# Patient Record
Sex: FEMALE | Race: WHITE | NOT HISPANIC OR LATINO | Employment: FULL TIME | URBAN - METROPOLITAN AREA
[De-identification: names, ages, dates, MRNs, and addresses within clinical notes are randomized per-mention and may not be internally consistent; named-entity substitution may affect disease eponyms.]

---

## 2018-06-21 ENCOUNTER — OFFICE VISIT (OUTPATIENT)
Dept: OBGYN | Facility: CLINIC | Age: 23
End: 2018-06-21
Payer: COMMERCIAL

## 2018-06-21 VITALS
SYSTOLIC BLOOD PRESSURE: 110 MMHG | HEIGHT: 70 IN | BODY MASS INDEX: 22.05 KG/M2 | HEART RATE: 60 BPM | WEIGHT: 154 LBS | DIASTOLIC BLOOD PRESSURE: 70 MMHG

## 2018-06-21 DIAGNOSIS — N94.6 DYSMENORRHEA: ICD-10-CM

## 2018-06-21 DIAGNOSIS — Z01.419 ENCOUNTER FOR GYNECOLOGICAL EXAMINATION WITHOUT ABNORMAL FINDING: Primary | ICD-10-CM

## 2018-06-21 PROCEDURE — G0145 SCR C/V CYTO,THINLAYER,RESCR: HCPCS | Performed by: NURSE PRACTITIONER

## 2018-06-21 PROCEDURE — 99385 PREV VISIT NEW AGE 18-39: CPT | Performed by: NURSE PRACTITIONER

## 2018-06-21 RX ORDER — DROSPIRENONE AND ETHINYL ESTRADIOL 0.03MG-3MG
KIT ORAL
Qty: 112 TABLET | Refills: 4 | Status: SHIPPED | OUTPATIENT
Start: 2018-06-21 | End: 2019-01-18

## 2018-06-21 RX ORDER — DROSPIRENONE AND ETHINYL ESTRADIOL 0.03MG-3MG
KIT ORAL
Refills: 2 | COMMUNITY
Start: 2018-05-26 | End: 2018-06-21

## 2018-06-21 ASSESSMENT — PATIENT HEALTH QUESTIONNAIRE - PHQ9: 5. POOR APPETITE OR OVEREATING: SEVERAL DAYS

## 2018-06-21 ASSESSMENT — ANXIETY QUESTIONNAIRES
7. FEELING AFRAID AS IF SOMETHING AWFUL MIGHT HAPPEN: NOT AT ALL
5. BEING SO RESTLESS THAT IT IS HARD TO SIT STILL: NOT AT ALL
6. BECOMING EASILY ANNOYED OR IRRITABLE: SEVERAL DAYS
GAD7 TOTAL SCORE: 4
2. NOT BEING ABLE TO STOP OR CONTROL WORRYING: NOT AT ALL
1. FEELING NERVOUS, ANXIOUS, OR ON EDGE: SEVERAL DAYS
IF YOU CHECKED OFF ANY PROBLEMS ON THIS QUESTIONNAIRE, HOW DIFFICULT HAVE THESE PROBLEMS MADE IT FOR YOU TO DO YOUR WORK, TAKE CARE OF THINGS AT HOME, OR GET ALONG WITH OTHER PEOPLE: SOMEWHAT DIFFICULT
3. WORRYING TOO MUCH ABOUT DIFFERENT THINGS: SEVERAL DAYS

## 2018-06-21 NOTE — LETTER
June 29, 2018      Eva Veto  [address] [facility]   [place] [address]    Dear ,      I am happy to inform you that your recent cervical cancer screening test (PAP smear) was normal.      Preventative screenings such as this help to ensure your health for years to come. You should repeat a pap smear in 1 year, unless otherwise directed.      You will still need to return to the clinic every year for your annual exam and other preventive tests.     Please contact the clinic at [phone] if you have further questions.       Sincerely,      Nydia Linares, APRN CNP/rlm

## 2018-06-21 NOTE — PROGRESS NOTES
Eva is a 22 year old female who presents for annual exam.     Besides routine health maintenance, she has no other health concerns today .    HPI:    New patient here today for her 1st gyn exam. She is feeling well. No complaints. She is taking Chris (Brand). Menses are normal flow, heaviest on day 2. Minimal cramping. Lasting 4 days total.     She is going to the [employer] [place] in July and is wondering if she can skip her menses. Continuous use of OCPs discussed today.    STD testing was last done at [facility].     The patient does not have a primary care provider.      GYNECOLOGIC HISTORY:    Patient's last menstrual period was 05/30/2018.  Her current contraception method is: oral contraceptives.  She  reports that she has never smoked. She has never used smokeless tobacco.  Patient is sexually active.  STD testing offered?  Declined     Last PHQ-9 score on record =   PHQ-9 SCORE 6/21/2018   Total Score 2     Last GAD7 score on record =   OVIDIO-7 SCORE 6/21/2018   Total Score 4     Alcohol Score = 3    HEALTH MAINTENANCE:  Cholesterol: never screened  Last Mammo: Never, Result: not applicable, Next Mammo: screen age 40  Pap: due today  Colonoscopy:  never, Result: not applicable, Next Colonoscopy: screen age 50  Dexa:  never  Health maintenance updated:  yes    HISTORY:  Obstetric History     No data available          There is no problem list on file for this patient.    History reviewed. No pertinent surgical history.   Social History   Substance Use Topics     Smoking status: Never Smoker     Smokeless tobacco: Never Used     Alcohol use Not on file           Current Outpatient Prescriptions   Medication Sig     CHRIS 28 3-0.03 MG per tablet 1 tablet daily by mouth. Active tabs only, skip placebo pills. Take continuously     [DISCONTINUED] CHRIS 28 3-0.03 MG per tablet TK 1 T PO QD     No current facility-administered medications for this visit.      No Known Allergies    Past medical,  "surgical, social and family histories were reviewed and updated in EPIC.    ROS:   12 point review of systems negative other than symptoms noted below.  Breast: Tenderness    EXAM:  /70  Pulse 60  Ht 5' 10\" (1.778 m)  Wt 154 lb (69.9 kg)  LMP 05/30/2018  BMI 22.1 kg/m2   BMI: Body mass index is 22.1 kg/(m^2).    PHYSICAL EXAM:  Constitutional:  Appearance: Well nourished, well developed, alert, in no acute distress  Neck:  Lymph Nodes:  No lymphadenopathy present    Thyroid:  Gland size normal, nontender, no nodules or masses present  on palpation  Chest:  Respiratory Effort:  Breathing unlabored  Cardiovascular:    Heart: Auscultation:  Regular rate, normal rhythm, no murmurs present  Breasts: Inspection of Breasts:  No lymphadenopathy present., Palpation of Breasts and Axillae:  No masses present on palpation, no breast tenderness., Axillary Lymph Nodes:  No lymphadenopathy present. and No nodularity, asymmetry or nipple discharge bilaterally.  Gastrointestinal:   Abdominal Examination:  Abdomen nontender to palpation, tone normal without rigidity or guarding, no masses present, umbilicus without lesions   Liver and Spleen:  No hepatomegaly present, liver nontender to palpation    Hernias:  No hernias present  Lymphatic: Lymph Nodes:  No other lymphadenopathy present  Skin:  General Inspection:  No rashes present, no lesions present, no areas of  discoloration    Genitalia and Groin:  No rashes present, no lesions present, no areas of  discoloration, no masses present  Neurologic/Psychiatric:    Mental Status:  Oriented X3     Pelvic Exam:  External Genitalia:     Normal appearance for age, no discharge present, no tenderness present, no inflammatory lesions present, color normal  Vagina:     Normal vaginal vault without central or paravaginal defects, no discharge present, no inflammatory lesions present, no masses present  Bladder:     Nontender to palpation  Urethra:   Urethral Body:  Urethra " palpation normal, urethra structural support normal   Urethral Meatus:  No erythema or lesions present  Cervix:     Appearance healthy, no lesions present, nontender to palpation, no bleeding present  Uterus:     Uterus: firm, normal sized and nontender, anteverted in position.   Adnexa:     No adnexal tenderness present, no adnexal masses present  Perineum:     Perineum within normal limits, no evidence of trauma, no rashes or skin lesions present  Anus:     Anus within normal limits, no hemorrhoids present  Inguinal Lymph Nodes:     No lymphadenopathy present  Pubic Hair:     Normal pubic hair distribution for age  Genitalia and Groin:     No rashes present, no lesions present, no areas of discoloration, no masses present      COUNSELING:   Special attention given to:        Regular exercise       Healthy diet/nutrition       Contraception       Safe sex practices/STD prevention    BMI: Body mass index is 22.1 kg/(m^2).      ASSESSMENT:  22 year old female with satisfactory annual exam.    ICD-10-CM    1. Encounter for gynecological examination without abnormal finding Z01.419 Pap imaged thin layer screen only - recommended age 21 - 24 years   2. Dysmenorrhea N94.6 CHRIS 28 3-0.03 MG per tablet       PLAN:  Healthy, normal gyn exam. 1st pap today. S/p gardasil series. Ok to continue OCP's x1 year. Will use continuously. Annual pap screening recommended    MATT Jonas CNP

## 2018-06-21 NOTE — MR AVS SNAPSHOT
"              After Visit Summary   2018    Eva Laws    MRN: [id number]           Patient Information     Date Of Birth          1995        Visit Information        Provider Department      2018 7:30 AM Nydia Linares APRN CNP [facility]        Today's Diagnoses     Encounter for gynecological examination without abnormal finding    -  1       Follow-ups after your visit        Who to contact     If you have questions or need follow up information about today's clinic visit or your schedule please contact [facility] directly at [phone].  Normal or non-critical lab and imaging results will be communicated to you by Radialogicahart, letter or phone within 4 business days after the clinic has received the results. If you do not hear from us within 7 days, please contact the clinic through Radialogicahart or phone. If you have a critical or abnormal lab result, we will notify you by phone as soon as possible.  Submit refill requests through [employer] or call your pharmacy and they will forward the refill request to us. Please allow 3 business days for your refill to be completed.          Additional Information About Your Visit        MyChart Information     [employer] lets you send messages to your doctor, view your test results, renew your prescriptions, schedule appointments and more. To sign up, go to [email or web address] . Click on \"Log in\" on the left side of the screen, which will take you to the Welcome page. Then click on \"Sign up Now\" on the right side of the page.     You will be asked to enter the access code listed below, as well as some personal information. Please follow the directions to create your username and password.     Your access code is: [email or web address]  Expires: 2018  4:05 PM     Your access code will  in 90 days. If you need help or a new code, please call your [facility] or [phone].        Care EveryWhere ID     This is " "your Care EveryWhere ID. This could be used by other organizations to access your [facility] medical records  [id number]        Your Vitals Were     Pulse Height Last Period BMI (Body Mass Index)          60 5' 10\" (1.778 m) 05/30/2018 22.1 kg/m2         Blood Pressure from Last 3 Encounters:   06/21/18 110/70    Weight from Last 3 Encounters:   06/21/18 154 lb (69.9 kg)              Today, you had the following     No orders found for display       Primary Care Provider    None Specified       No primary provider on file.        Equal Access to Services     [facility]: Hadii aad ku hadasho Soomaali, waaxda luqadaha, qaybta kaalmada adeegyachace, vi henry . So [facility] [phone].    ATENCIÓN: Si habla español, tiene a santos disposición servicios gratuitos de asistencia lingüística. Llame al [phone].    We comply with applicable federal civil rights laws and [place] laws. We do not discriminate on the basis of race, color, national origin, age, disability, sex, sexual orientation, or gender identity.            Thank you!     Thank you for choosing [facility]  for your care. Our goal is always to provide you with excellent care. Hearing back from our patients is one way we can continue to improve our services. Please take a few minutes to complete the written survey that you may receive in the mail after your visit with us. Thank you!             Your Updated Medication List - Protect others around you: Learn how to safely use, store and throw away your medicines at www.disposemymeds.org.          This list is accurate as of 6/21/18  8:03 AM.  Always use your most recent med list.                   Brand Name Dispense Instructions for use Diagnosis    CHRIS 28 3-0.03 MG per tablet   Generic drug:  drospirenone-ethinyl estradiol      TK 1 T PO QD          "

## 2018-06-22 ASSESSMENT — PATIENT HEALTH QUESTIONNAIRE - PHQ9: SUM OF ALL RESPONSES TO PHQ QUESTIONS 1-9: 2

## 2018-06-22 ASSESSMENT — ANXIETY QUESTIONNAIRES: GAD7 TOTAL SCORE: 4

## 2018-06-25 LAB
COPATH REPORT: NORMAL
PAP: NORMAL

## 2018-12-27 ENCOUNTER — TELEPHONE (OUTPATIENT)
Dept: OBGYN | Facility: CLINIC | Age: 23
End: 2018-12-27

## 2018-12-27 NOTE — TELEPHONE ENCOUNTER
Received fax from pharmacy that plan does not cover Yi.    Upon review of chart patient was last seen 6/21/18 for annual exam with Nydia Linares and the note specifies that the patient is taking Yi brand name. RX is written as JUVENTINO.     Left message for patient to call back.     Need to ask patient if she's been having any trouble picking up the brand RX, or if her insurance changed since it is now generating the need for a PA with her recent refill.    Also, before starting the PA, will need reason why patient needs to take brand to complete that piece of information.

## 2018-12-31 NOTE — TELEPHONE ENCOUNTER
CENTRAL PRIOR AUTHORIZATION  [phone]    PA Initiation    Medication: CHRIS 28 3-0.03 MG per tablet - INITAITED 12/31/2018  Insurance Company: [facility] [employer] - Phone [phone] Fax [phone]  Pharmacy Filling the Rx: [facility] [address] [place] [address] AT [address]  Filling Pharmacy Phone: [phone]  Filling Pharmacy Fax:    Start Date: 12/31/2018    Submitted and am waiting on the question set to load from the insurance company.

## 2018-12-31 NOTE — TELEPHONE ENCOUNTER
Prior Authorization Retail Medication Request    Medication/Dose: YI 28 3-0.03 MG per tablet  ICD code (if different than what is on RX):    Previously Tried and Failed:    Rationale:  as stated below    Insurance Name:   [employer]   Insurance ID:  [id number]       Pharmacy Information (if different than what is on RX)  Name: Phone:    -------------------------------------------------------------------------------------------------------------------------  Spoke to patient regarding the need for a PA and further clarification.    Patient states she has not had an insurance change and has been picking up her RX without an issue.  Patient needs this RX JUVENTINO due to 1) irregular bleeding on other brand/generic,  2) hormone levels affecting acne.    She has been successful taking Yi (BRAND) continuously for these reasons.    Patient is aware we will start the PA process and update her.

## 2019-01-03 ENCOUNTER — TELEPHONE (OUTPATIENT)
Dept: OBGYN | Facility: CLINIC | Age: 24
End: 2019-01-03

## 2019-01-03 DIAGNOSIS — N94.6 DYSMENORRHEA: ICD-10-CM

## 2019-01-03 NOTE — TELEPHONE ENCOUNTER
Upon calling the patient's insruance and pharmacy.  The pharmacy is billing the medication to a [employer] plan and now a [employer].  The [employer] plan does cover the brand without a PA.  The [employer] insurance does not, plan exclusion.  I did call [employer] and we can do a exception to coverage determination.  I am waiting on the form that should be faxed from them to the PA department at [phone].      I will finish this documentation for this insurance and start a new one for the different insurance.

## 2019-01-03 NOTE — TELEPHONE ENCOUNTER
I have called both the insurance company and [facility].  Yesterday, the claim was going through on the insurance side end of getting a paid claim stating that it should be covered.  [facility] was submitting the claim, but not getting a claim number and showing that the cost should be $10.00.  I called to double check with [facility] if the claim did go through, but it is now showing a plan exclusion.  They will fax the PA department at [phone] and I will look into what is going on further.

## 2019-01-03 NOTE — TELEPHONE ENCOUNTER
CENTRAL PRIOR AUTHORIZATION  [phone]    PA Initiation    Medication: CHRIS 28 3-0.03 MG per tablet - INITAITED THROUGH [employer] 01/03/2019 WAITING ON THE FORM  Insurance Company: [employer] - Phone [phone] Fax [phone]  [facility] [address] [place] [address] AT [facility] [address]  Filling Pharmacy Phone: [phone]  Filling Pharmacy Fax:    Start Date: 1/3/2019    I had to call [employer] to have them send me the form by fax.  When I tried to initiate the request, it said that the patient did not have active coverage.  When I called [employer] and verified the patient information, I was informed that her ID with them is [id number].

## 2019-01-03 NOTE — TELEPHONE ENCOUNTER
Prior Authorization Not Needed per Insurance    Medication: YI 28 3-0.03 MG per tablet - PA NOT NEEDED UNDER [employer]-BUT PHARMACY BILLING A [employer] PLAN 01/03/2019  Insurance Company: [facility] [employer] - Phone [phone] Fax [phone]  Expected [employer]:      Pharmacy Filling the Rx: [facility] [address] [place] [address] AT [facility] [address]  Pharmacy Notified: Yes  Patient Notified: No    Under the plan with [employer], the brand Yi is covered without a PA.  The pharmacy on the other hand is billing to a [employer] plan.  After many calls, I did find out that they were billing a different plan. From the many calls to insurance, it sounds like she is still active with [employer] and she is active with [employer] since 2016.  They are going to fax a 'exception to coverage form as the medication with them is a plan exclusion and that is what they other to try and see if we can get it covered that way.  I am currently just waiting on the form from them.

## 2019-01-04 NOTE — TELEPHONE ENCOUNTER
PRIOR AUTHORIZATION DENIED    Medication: CHRIS 28 3-0.03 MG per tablet - DENIED THROUGH [employer] 01/04/2019    Denial Date: 1/4/2019    Denial Rational: IT HAS BEEN DETERMINED THAT THE SUBMITTED REQUEST DOES NOT MEET THE CRITERIA ESTABLISHED FOR COVERAGE OF THIS MEDIATION, DETERMINED THAT HAS BEEN DETERMINED BY THE [facility] PHARMACY AND THERAPEUTICS COMMITTEE FOR THE FOLLOWING REASON(S): (1) THE GENERIC VERSION OF THIS DRUG, CALLED OCELLA MUST BE TRIED/FAILED. (2) ALL OTHER FORMULARY DRUGS USED FOR YOUR CONDITION MUST BE TRIED AND FAILED. OTHER DRUGS THAT CAN BE USED ARE GIANVI. (3) A COMPLETED FDA MEDWATCH FORM WAS NOT IS USED TO ALERT THE FDA OR EFFICACY AND SAFETY PROBLEMS WITH THE GENERIC DRUG. PLEASE LOOK AT THE FORMULARY FOR A LIST OF COVERED DRUGS.        Appeal Information: IF THE PROVIDER WOULD LIKE APPEAL THIS DENIAL, PLEASE HAVE THEM PROVIDE A LETTER OF MEDICAL NECESSITY ALONG WITH ANY DOCUMENTATION THAT STATES THERAPIES TRIED/OUTCOMES. ONCE IT HAS BEEN PLACED IN THE PATIENT'S CHART, PLEASE NOTIFY THE PA TEAM.

## 2019-01-17 NOTE — TELEPHONE ENCOUNTER
Patient was notified of denial-she doesn't have a strong preference and would like Nydia to pick btwn the 2 options listed. ocella or gianvi

## 2019-01-18 RX ORDER — DROSPIRENONE AND ETHINYL ESTRADIOL 0.03MG-3MG
KIT ORAL
Qty: 112 TABLET | Refills: 4 | Status: SHIPPED | OUTPATIENT
Start: 2019-01-18 | End: 2019-07-02

## 2019-01-18 NOTE — TELEPHONE ENCOUNTER
Ocella is the generic equivalent to Yi. JUVENTINO removed from RX and resent to pharmacy.    Gianvi is a slightly lower estrogen dose and the equivalent to Kathleen.

## 2019-06-27 NOTE — PROGRESS NOTES
Eva is a 23 year old No obstetric history on file. female who presents for annual exam.     Besides routine health maintenance, she has no other health concerns today .    HPI:  The patient's PCP is  No primary care provider on file. Pt here today for her annual exam. She is feeling well. Starting grad school for social work.    ocps are working well. No issues.     Pap up to date. NIL in 2018. Will pap in 2021. Will update CT GC today.         GYNECOLOGIC HISTORY:    Patient's last menstrual period was 06/19/2019.  Her current contraception method is: oral contraceptives.  She  reports that she has never smoked. She has never used smokeless tobacco.    Patient is sexually active.  STD testing offered?  Accepted  Last PHQ-9 score on record =   PHQ-9 SCORE 7/2/2019   PHQ-9 Total Score 6     Last GAD7 score on record =   OVIDIO-7 SCORE 7/2/2019   Total Score 5     Alcohol Score = 2    HEALTH MAINTENANCE:  Cholesterol: (No results found for: CHOL   Last Mammo: never, Result: not applicable, Next Mammo: due age 41.  Pap: (  Lab Results   Component Value Date    PAP NIL 06/21/2018    )   Colonoscopy:  never, Result: not applicable, Next Colonoscopy: due age 51 years.  Dexa:  NA    Health maintenance updated:  yes    HISTORY:  OB History   No data available       There is no problem list on file for this patient.    No past surgical history on file.   Social History     Tobacco Use     Smoking status: Never Smoker     Smokeless tobacco: Never Used   Substance Use Topics     Alcohol use: Not on file           Current Outpatient Medications   Medication Sig     drospirenone-ethinyl estradiol (CHRIS 28) 3-0.03 MG tablet 1 tablet daily by mouth. Active tabs only, skip placebo pills. Take continuously     No current facility-administered medications for this visit.      No Known Allergies    Past medical, surgical, social and family histories were reviewed and updated in EPIC.    ROS:   12 point review of systems negative  "other than symptoms noted below.  Skin: Acne    EXAM:  /62   Pulse 58   Ht 1.778 m (5' 10\")   Wt 71.2 kg (157 lb)   LMP 06/19/2019   BMI 22.53 kg/m     BMI: Body mass index is 22.53 kg/m .    PHYSICAL EXAM:  Constitutional:  Appearance: Well nourished, well developed, alert, in no acute distress  Neck:  Lymph Nodes:  No lymphadenopathy present    Thyroid:  Gland size normal, nontender, no nodules or masses present  on palpation  Chest:  Respiratory Effort:  Breathing unlabored  Cardiovascular:    Heart: Auscultation:  Regular rate, normal rhythm, no murmurs present  Breasts: Inspection of Breasts:  No lymphadenopathy present., Palpation of Breasts and Axillae:  No masses present on palpation, no breast tenderness., Axillary Lymph Nodes:  No lymphadenopathy present. and No nodularity, asymmetry or nipple discharge bilaterally.  Gastrointestinal:   Abdominal Examination:  Abdomen nontender to palpation, tone normal without rigidity or guarding, no masses present, umbilicus without lesions   Liver and Spleen:  No hepatomegaly present, liver nontender to palpation    Hernias:  No hernias present  Lymphatic: Lymph Nodes:  No other lymphadenopathy present  Skin:  General Inspection:  No rashes present, no lesions present, no areas of  discoloration    Genitalia and Groin:  No rashes present, no lesions present, no areas of  discoloration, no masses present  Neurologic/Psychiatric:    Mental Status:  Oriented X3     Pelvic Exam:  External Genitalia:     Normal appearance for age, no discharge present, no tenderness present, no inflammatory lesions present, color normal  Vagina:     Normal vaginal vault without central or paravaginal defects, no discharge present, no inflammatory lesions present, no masses present  Bladder:     Nontender to palpation  Urethra:   Urethral Body:  Urethra palpation normal, urethra structural support normal   Urethral Meatus:  No erythema or lesions present  Cervix:     Appearance " healthy, no lesions present, nontender to palpation, no bleeding present  Uterus:     Uterus: firm, normal sized and nontender, midplane in position.   Adnexa:     No adnexal tenderness present, no adnexal masses present  Perineum:     Perineum within normal limits, no evidence of trauma, no rashes or skin lesions present  Anus:     Anus within normal limits, no hemorrhoids present  Inguinal Lymph Nodes:     No lymphadenopathy present  Pubic Hair:     Normal pubic hair distribution for age  Genitalia and Groin:     No rashes present, no lesions present, no areas of discoloration, no masses present      COUNSELING:   Special attention given to:        Regular exercise       Healthy diet/nutrition       Contraception       Safe sex practices/STD prevention    BMI: Body mass index is 22.53 kg/m .      ASSESSMENT:  23 year old female with satisfactory annual exam.    ICD-10-CM    1. Encounter for gynecological examination without abnormal finding Z01.419    2. Dysmenorrhea N94.6 drospirenone-ethinyl estradiol (CHRIS 28) 3-0.03 MG tablet   3. Screen for STD (sexually transmitted disease) Z11.3 NEISSERIA GONORRHOEA PCR   4. Screening for chlamydial disease Z11.8 CHLAMYDIA TRACHOMATIS PCR       PLAN:  Normal gyn exam. Will update on STD testing. Ok to continue OCP's x1 year.     MATT Jonas CNP

## 2019-07-02 ENCOUNTER — OFFICE VISIT (OUTPATIENT)
Dept: OBGYN | Facility: CLINIC | Age: 24
End: 2019-07-02
Payer: COMMERCIAL

## 2019-07-02 VITALS
DIASTOLIC BLOOD PRESSURE: 62 MMHG | HEART RATE: 58 BPM | WEIGHT: 157 LBS | BODY MASS INDEX: 22.48 KG/M2 | SYSTOLIC BLOOD PRESSURE: 102 MMHG | HEIGHT: 70 IN

## 2019-07-02 DIAGNOSIS — Z11.8 SCREENING FOR CHLAMYDIAL DISEASE: ICD-10-CM

## 2019-07-02 DIAGNOSIS — Z01.419 ENCOUNTER FOR GYNECOLOGICAL EXAMINATION WITHOUT ABNORMAL FINDING: Primary | ICD-10-CM

## 2019-07-02 DIAGNOSIS — N94.6 DYSMENORRHEA: ICD-10-CM

## 2019-07-02 DIAGNOSIS — Z11.3 SCREEN FOR STD (SEXUALLY TRANSMITTED DISEASE): ICD-10-CM

## 2019-07-02 PROCEDURE — 87591 N.GONORRHOEAE DNA AMP PROB: CPT | Performed by: NURSE PRACTITIONER

## 2019-07-02 PROCEDURE — 99395 PREV VISIT EST AGE 18-39: CPT | Performed by: NURSE PRACTITIONER

## 2019-07-02 PROCEDURE — 87491 CHLMYD TRACH DNA AMP PROBE: CPT | Performed by: NURSE PRACTITIONER

## 2019-07-02 RX ORDER — DROSPIRENONE AND ETHINYL ESTRADIOL 0.03MG-3MG
KIT ORAL
Qty: 112 TABLET | Refills: 4 | Status: SHIPPED | OUTPATIENT
Start: 2019-07-02 | End: 2020-09-15

## 2019-07-02 ASSESSMENT — MIFFLIN-ST. JEOR: SCORE: 1552.4

## 2019-07-02 ASSESSMENT — ANXIETY QUESTIONNAIRES
1. FEELING NERVOUS, ANXIOUS, OR ON EDGE: MORE THAN HALF THE DAYS
5. BEING SO RESTLESS THAT IT IS HARD TO SIT STILL: NOT AT ALL
IF YOU CHECKED OFF ANY PROBLEMS ON THIS QUESTIONNAIRE, HOW DIFFICULT HAVE THESE PROBLEMS MADE IT FOR YOU TO DO YOUR WORK, TAKE CARE OF THINGS AT HOME, OR GET ALONG WITH OTHER PEOPLE: SOMEWHAT DIFFICULT
6. BECOMING EASILY ANNOYED OR IRRITABLE: SEVERAL DAYS
3. WORRYING TOO MUCH ABOUT DIFFERENT THINGS: SEVERAL DAYS
2. NOT BEING ABLE TO STOP OR CONTROL WORRYING: SEVERAL DAYS
GAD7 TOTAL SCORE: 5
7. FEELING AFRAID AS IF SOMETHING AWFUL MIGHT HAPPEN: NOT AT ALL

## 2019-07-02 ASSESSMENT — PATIENT HEALTH QUESTIONNAIRE - PHQ9
SUM OF ALL RESPONSES TO PHQ QUESTIONS 1-9: 6
5. POOR APPETITE OR OVEREATING: NOT AT ALL

## 2019-07-03 LAB
C TRACH DNA SPEC QL NAA+PROBE: NEGATIVE
N GONORRHOEA DNA SPEC QL NAA+PROBE: NEGATIVE
SPECIMEN SOURCE: NORMAL
SPECIMEN SOURCE: NORMAL

## 2019-07-03 ASSESSMENT — ANXIETY QUESTIONNAIRES: GAD7 TOTAL SCORE: 5

## 2020-03-11 ENCOUNTER — HEALTH MAINTENANCE LETTER (OUTPATIENT)
Age: 25
End: 2020-03-11

## 2020-09-15 DIAGNOSIS — N94.6 DYSMENORRHEA: ICD-10-CM

## 2020-09-15 RX ORDER — DROSPIRENONE AND ETHINYL ESTRADIOL 0.03MG-3MG
KIT ORAL
Qty: 28 TABLET | Refills: 1 | Status: SHIPPED | OUTPATIENT
Start: 2020-09-15 | End: 2024-05-29 | Stop reason: ALTCHOICE

## 2020-09-15 NOTE — TELEPHONE ENCOUNTER
"Requested Prescriptions   Pending Prescriptions Disp Refills     drospirenone-ethinyl estradiol (CHRIS 28) 3-0.03 MG tablet 112 tablet 4     Si tablet daily by mouth. Active tabs only, skip placebo pills. Take continuously       Contraceptives Protocol Failed - 9/15/2020  7:32 AM        Failed - Recent (12 mo) or future (30 days) visit within the authorizing provider's specialty     Patient has had an office visit with the authorizing provider or a provider within the authorizing providers department within the previous 12 mos or has a future within next 30 days. See \"Patient Info\" tab in inbasket, or \"Choose Columns\" in Meds & Orders section of the refill encounter.              Passed - Patient is not a current smoker if age is 35 or older        Passed - Medication is active on med list        Passed - No active pregnancy on record        Passed - No positive pregnancy test in past 12 months           Last Written Prescription Date:  19  Last Fill Quantity: 112,  # refills: 4   Last office visit: 2019 with prescribing provider:  Nydia Linares   Future Office Visit:  none    Medication is being filled for 1 time refill only due to:  Patient needs to be seen because it has been more than one year since last visit.   Mirta Acosta RN on 9/15/2020 at 7:54 AM        "

## 2021-01-03 ENCOUNTER — HEALTH MAINTENANCE LETTER (OUTPATIENT)
Age: 26
End: 2021-01-03

## 2021-04-25 ENCOUNTER — HEALTH MAINTENANCE LETTER (OUTPATIENT)
Age: 26
End: 2021-04-25

## 2021-10-10 ENCOUNTER — HEALTH MAINTENANCE LETTER (OUTPATIENT)
Age: 26
End: 2021-10-10

## 2022-01-29 ENCOUNTER — HEALTH MAINTENANCE LETTER (OUTPATIENT)
Age: 27
End: 2022-01-29

## 2022-07-19 ENCOUNTER — TRANSFERRED RECORDS (OUTPATIENT)
Dept: HEALTH INFORMATION MANAGEMENT | Facility: CLINIC | Age: 27
End: 2022-07-19

## 2022-09-18 ENCOUNTER — HEALTH MAINTENANCE LETTER (OUTPATIENT)
Age: 27
End: 2022-09-18

## 2023-05-06 ENCOUNTER — HEALTH MAINTENANCE LETTER (OUTPATIENT)
Age: 28
End: 2023-05-06

## 2024-05-28 NOTE — PROGRESS NOTES
"Eva is a 28 year old  female who presents for annual exam.     Besides routine health maintenance, would like discuss right breast pain and a change her last cycle.    HPI:    Eva is here today for annual physical exam and refill on OCPs. She is doing well on OCPs, has been seeing [facility] recently due to insurance reasons. She does report getting her cycle twice in the last month but her and her boyfriend were trialing the Keto diet at the time. She is sexually active, but declines STD testing today.     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       GYNECOLOGIC HISTORY:    Patient's last menstrual period was 2024.  Regular menses? Had one irregular recently  Menses every 28 days.  Length of menses: 4 days  Her current contraception method is: oral contraceptives.  She  reports that she has never smoked. She has never used smokeless tobacco.  Patient is sexually active.  STD testing offered?  Declined    Last PHQ-9 score on record =       2024     8:47 AM   PHQ-9 SCORE   PHQ-9 Total Score 1     Last GAD7 score on record =       2024     8:47 AM   OVIDIO-7 SCORE   Total Score 3     Alcohol Score = 4    HEALTH MAINTENANCE:  Cholesterol: (No results found for: \"CHOL\"   Last Mammo: N/A, Result: not applicable, Next Mammo: screen age 40  Pap:   Lab Results   Component Value Date    PAP NIL 2018   Colonoscopy:  N/A, Result: not applicable, Next Colonoscopy: screen age 45  Dexa:  N/A  Health maintenance reviewed    HISTORY:  OB History    Para Term  AB Living   0 0 0 0 0 0   SAB IAB Ectopic Multiple Live Births   0 0 0 0 0       There is no problem list on file for this patient.    Past Surgical History:   Procedure Laterality Date    NO HISTORY OF SURGERY        Social History     Tobacco Use    Smoking status: Never    Smokeless tobacco: Never   Substance Use Topics    Alcohol use: Not on file " "     Problem (# of Occurrences) Relation (Name,Age of Onset)    Heart Disease (1) Father    Breast Cancer (1) Maternal Grandmother    No Known Problems (1) Mother              Current Outpatient Medications   Medication Sig Dispense Refill    VESTURA 3-0.02 MG tablet Take 1 tablet by mouth daily 84 tablet 3     No current facility-administered medications for this visit.     Allergies   Allergen Reactions    Sulfa Antibiotics Hives       Past medical, surgical, social and family histories were reviewed and updated in [facility].    ROS:     No urinary frequency or dysuria, bladder or kidney problems, reports getting two menstrual cycles in the last month, otherwise has been regular    EXAM:  /70   Ht 1.778 m (5' 10\")   Wt 75.8 kg (167 lb)   LMP 05/23/2024   BMI 23.96 kg/m     BMI: Body mass index is 23.96 kg/m .    PHYSICAL EXAM:  Constitutional:   Appearance: Well nourished, well developed, alert, in no acute distress  Breasts: Inspection of Breasts:  No lymphadenopathy present., Palpation of Breasts and Axillae:  No masses present on palpation, no breast tenderness., Axillary Lymph Nodes:  No lymphadenopathy present., and No nodularity, asymmetry or nipple discharge bilaterally.  Skin:  General Inspection:  No rashes present, no lesions present, no areas of  discoloration  Neurologic:    Mental Status:  Oriented X3.  Normal strength and tone, sensory exam                grossly normal, mentation intact and speech normal.    Psychiatric:   Mentation appears normal and affect normal/bright.         Pelvic Exam:  External Genitalia:     Normal appearance for age, no discharge present, no tenderness present, no inflammatory lesions present, color normal  Vagina:     Normal vaginal vault without central or paravaginal defects, no discharge present, no inflammatory lesions present, no masses present  Bladder:     Nontender to palpation  Urethra:   Urethral Body:  Urethra palpation normal, urethra structural support " normal   Urethral Meatus:  No erythema or lesions present  Cervix:     Appearance healthy, no lesions present, nontender to palpation, no bleeding present  Uterus:     Uterus: firm, normal sized and nontender, midplane in position.   Adnexa:     No adnexal tenderness present, no adnexal masses present  Perineum:     Perineum within normal limits, no evidence of trauma, no rashes or skin lesions present  Anus:     Anus within normal limits, no hemorrhoids present  Inguinal Lymph Nodes:     No lymphadenopathy present  Pubic Hair:     Normal pubic hair distribution for age  Genitalia and Groin:     No rashes present, no lesions present, no areas of discoloration, no masses present    COUNSELING:   Reviewed preventive health counseling, as reflected in patient instructions  Special attention given to:        Contraception    BMI: Body mass index is 23.96 kg/m .      ASSESSMENT:  28 year old female with satisfactory annual exam.    ICD-10-CM    1. Encounter for gynecological examination without abnormal finding  Z01.419       2. Encounter for surveillance of contraceptive pills  Z30.41 VESTURA 3-0.02 MG tablet      3. Screening for cervical cancer  Z12.4 Pap Screen Reflex to HPV if ASCUS - Recommended Age 25 - 29 Years          PLAN:  Pap smear done today. Will notify with results. If normal, can repeat in 3 years. Refill of OCPs today. Discussed Keto diet being the likely causative factor, with diet change, as why she got the two menstrual cycles in one month recently. She is not doing Keto anymore and will notify with any menstrual cycle irregularities moving forward. Will return in one year.     MATT Jonas CNP

## 2024-05-29 ENCOUNTER — OFFICE VISIT (OUTPATIENT)
Dept: OBGYN | Facility: CLINIC | Age: 29
End: 2024-05-29
Payer: COMMERCIAL

## 2024-05-29 VITALS
WEIGHT: 167 LBS | HEIGHT: 70 IN | DIASTOLIC BLOOD PRESSURE: 70 MMHG | BODY MASS INDEX: 23.91 KG/M2 | SYSTOLIC BLOOD PRESSURE: 108 MMHG

## 2024-05-29 DIAGNOSIS — Z30.41 ENCOUNTER FOR SURVEILLANCE OF CONTRACEPTIVE PILLS: ICD-10-CM

## 2024-05-29 DIAGNOSIS — Z01.419 ENCOUNTER FOR GYNECOLOGICAL EXAMINATION WITHOUT ABNORMAL FINDING: Primary | ICD-10-CM

## 2024-05-29 DIAGNOSIS — Z12.4 SCREENING FOR CERVICAL CANCER: ICD-10-CM

## 2024-05-29 PROCEDURE — 99459 PELVIC EXAMINATION: CPT | Performed by: NURSE PRACTITIONER

## 2024-05-29 PROCEDURE — 99385 PREV VISIT NEW AGE 18-39: CPT | Performed by: NURSE PRACTITIONER

## 2024-05-29 PROCEDURE — G0145 SCR C/V CYTO,THINLAYER,RESCR: HCPCS | Performed by: NURSE PRACTITIONER

## 2024-05-29 RX ORDER — DROSPIRENONE AND ETHINYL ESTRADIOL 0.02-3(28)
1 KIT ORAL DAILY
Qty: 84 TABLET | Refills: 3 | Status: SHIPPED | OUTPATIENT
Start: 2024-05-29

## 2024-05-29 RX ORDER — DROSPIRENONE AND ETHINYL ESTRADIOL 0.02-3(28)
1 KIT ORAL DAILY
COMMUNITY
Start: 2024-05-09 | End: 2024-05-29

## 2024-05-29 ASSESSMENT — PATIENT HEALTH QUESTIONNAIRE - PHQ9
SUM OF ALL RESPONSES TO PHQ QUESTIONS 1-9: 1
5. POOR APPETITE OR OVEREATING: NOT AT ALL

## 2024-05-29 ASSESSMENT — ANXIETY QUESTIONNAIRES
1. FEELING NERVOUS, ANXIOUS, OR ON EDGE: SEVERAL DAYS
IF YOU CHECKED OFF ANY PROBLEMS ON THIS QUESTIONNAIRE, HOW DIFFICULT HAVE THESE PROBLEMS MADE IT FOR YOU TO DO YOUR WORK, TAKE CARE OF THINGS AT HOME, OR GET ALONG WITH OTHER PEOPLE: NOT DIFFICULT AT ALL
5. BEING SO RESTLESS THAT IT IS HARD TO SIT STILL: NOT AT ALL
2. NOT BEING ABLE TO STOP OR CONTROL WORRYING: NOT AT ALL
3. WORRYING TOO MUCH ABOUT DIFFERENT THINGS: SEVERAL DAYS
GAD7 TOTAL SCORE: 3
7. FEELING AFRAID AS IF SOMETHING AWFUL MIGHT HAPPEN: SEVERAL DAYS
GAD7 TOTAL SCORE: 3
6. BECOMING EASILY ANNOYED OR IRRITABLE: NOT AT ALL

## 2024-06-03 LAB
BKR LAB AP GYN ADEQUACY: NORMAL
BKR LAB AP GYN INTERPRETATION: NORMAL
BKR LAB AP HPV REFLEX: NORMAL
BKR LAB AP PREVIOUS ABNORMAL: NORMAL
PATH REPORT.COMMENTS IMP SPEC: NORMAL
PATH REPORT.COMMENTS IMP SPEC: NORMAL
PATH REPORT.RELEVANT HX SPEC: NORMAL

## 2024-12-12 ENCOUNTER — TELEPHONE (OUTPATIENT)
Dept: OBGYN | Facility: CLINIC | Age: 29
End: 2024-12-12
Payer: COMMERCIAL

## 2024-12-12 NOTE — TELEPHONE ENCOUNTER
"Pt reports being a strange week.  Feeling much better now.  Found lump in breast last week, was on her period at the time.  Feels different from prior lumps    Also lost valencia last week    Also have migraine this morning, has resolved.    Lump has not changed at all since period is almost over.  Denies any discharge from nipple, breast discolored, fever.  Lump is hard does not move much, \"stiff\"    Recommend visit for breast exam  Pt verbalized understanding, in agreement with plan, and voiced no further questions.  Pt transferred to scheduling  Margret Lewis RN on 12/12/2024 at 1:34 PM   [facility]      "

## 2024-12-12 NOTE — TELEPHONE ENCOUNTER
[facility] Call [facility]    Phone Message    May a detailed message be left on voicemail: yes     Reason for Call: pt calling to schedule appt for lump in right breast (hard like a bead) along with a migraine that she's never gotten before. Sending TE per procedures, unable to schedule within 2 days.  Please call pt to discuss/schedule    Action Taken: Message routed to:  Other: [facility]    Travel Screening: Not Applicable

## 2024-12-19 ENCOUNTER — HOSPITAL ENCOUNTER (OUTPATIENT)
Dept: MAMMOGRAPHY | Facility: CLINIC | Age: 29
Discharge: HOME | End: 2024-12-19
Payer: COMMERCIAL

## 2024-12-19 DIAGNOSIS — N63.15 MASS OVERLAPPING MULTIPLE QUADRANTS OF RIGHT BREAST: ICD-10-CM

## 2024-12-19 PROCEDURE — 76642 ULTRASOUND BREAST LIMITED: CPT | Mod: RT

## 2025-05-28 RX ORDER — SPIRONOLACTONE 100 MG/1
TABLET, FILM COATED ORAL
COMMUNITY
Start: 2024-06-01 | End: 2025-06-03

## 2025-05-28 NOTE — PROGRESS NOTES
Eva is a 29 year old  female who presents for annual exam.     Besides routine health maintenance, she has no other health concerns today .    HPI:  The patient's PCP is Physician No Ref-Primary. Eva is here today for her annual well woman exam. She has no GYN concerns today.    She is currently on DESMOND and they have been working well for her. Her periods are regular and manageable but she has been experiencing some PMS symptoms around the time of her period. She is sexually active and denies dyspareunia. She did have a breast US  due to a lump on her right breast- fibroadenoma/ benign but is encouraged to follow up with a repeat US this month. She denies changes in the breast lump.     She works as a  for [employer]. She enjoys her job.       GYNECOLOGIC HISTORY:    Patient's last menstrual period was 2025 (exact date).    Regular menses? yes  Menses every 28 days.  Length of menses: 5 days    Her current contraception method is: oral contraceptives.  She  reports that she has never smoked. She has never been exposed to tobacco smoke. She has never used smokeless tobacco.    Patient is sexually active.  STD testing offered?  Declined  Last PHQ-9 score on record =       2024    10:40 PM   PHQ-9 SCORE   PHQ-9 Total Score MyChart 11 (Moderate depression)   PHQ-9 Total Score 11        Patient-reported     Last GAD7 score on record =       2024     8:47 AM   OVIDIO-7 SCORE   Total Score 3     Alcohol Score =     HEALTH MAINTENANCE:  Cholesterol: NA  Last Mammo: 2024 Diagnostic, Rt breast US, Result: Normal, Next Mammo: Due at age 41     Pap:   Lab Results   Component Value Date    GYNINTERP  2024     Negative for Intraepithelial Lesion or Malignancy (NILM)    PAP NIL 2018     Colonoscopy:  NA, Result: Not applicable, Next Colonoscopy: 46 years.  Dexa:  NA    Health maintenance updated:  yes    HISTORY:  OB History    Para Term  AB Living  "  0 0 0 0 0 0   SAB IAB Ectopic Multiple Live Births   0 0 0 0 0       Patient Active Problem List   Diagnosis    Acne     Past Surgical History:   Procedure Laterality Date    NO HISTORY OF SURGERY        Social History     Tobacco Use    Smoking status: Never     Passive exposure: Never    Smokeless tobacco: Never   Substance Use Topics    Alcohol use: Yes     Comment: a few drinks weekly      Problem (# of Occurrences) Relation (Name,Age of Onset)    Heart Disease (1) Father    Breast Cancer (1) Maternal Grandmother    No Known Problems (1) Mother              Current Outpatient Medications   Medication Sig Dispense Refill    VESTURA 3-0.02 MG tablet Take 1 tablet by mouth daily. 84 tablet 3     No current facility-administered medications for this visit.     Allergies   Allergen Reactions    Sulfa Antibiotics Hives       Past medical, surgical, social and family histories were reviewed and updated in [facility].    EXAM:  /60   Ht 1.784 m (5' 10.25\")   Wt 74 kg (163 lb 3.2 oz)   LMP 05/22/2025 (Exact Date)   Breastfeeding No   BMI 23.25 kg/m     BMI: Body mass index is 23.25 kg/m .    PHYSICAL EXAM:  Constitutional:   Appearance: Well nourished, well developed, alert, in no acute distress  Chest:  Respiratory Effort:  Breathing unlabored  Cardiovascular:    Heart: Auscultation:  Regular rate, normal rhythm, no murmurs present  Breasts: Right- small dime sized lump noted at 9 o'clock. No tenderness. Left- No lumps, bumps or tenderness.  Skin:  General Inspection:  No rashes present, no lesions present, no areas of  discoloration  Neurologic:    Mental Status:  Oriented X3.  Normal strength and tone, sensory exam                grossly normal, mentation intact and speech normal.    Psychiatric:   Mentation appears normal and affect normal/bright.         Pelvic Exam:  External Genitalia:     Normal appearance for age, no discharge present, no tenderness present, no inflammatory lesions present, color " normal  Vagina:     Normal vaginal vault without central or paravaginal defects, no discharge present, no inflammatory lesions present, no masses present  Bladder:     Nontender to palpation  Urethra:   Urethral Body:  Urethra palpation normal, urethra structural support normal   Urethral Meatus:  No erythema or lesions present  Cervix:     Appearance healthy, no lesions present, nontender to palpation, no bleeding present  Uterus:     Uterus: firm, normal sized and nontender, anteverted in position.   Adnexa:     No adnexal tenderness present, no adnexal masses present  Perineum:     Perineum within normal limits, no evidence of trauma, no rashes or skin lesions present  Anus:     Anus within normal limits, no hemorrhoids present  Inguinal Lymph Nodes:     No lymphadenopathy present  Pubic Hair:     Normal pubic hair distribution for age  Genitalia and Groin:     No rashes present, no lesions present, no areas of discoloration, no masses present    COUNSELING:   Reviewed preventive health counseling, as reflected in patient instructions       Regular exercise       Healthy diet/nutrition    BMI: Body mass index is 23.25 kg/m .      ASSESSMENT:  29 year old female with satisfactory annual exam.    ICD-10-CM    1. Encounter for gynecological examination without abnormal finding  Z01.419       2. Encounter for surveillance of contraceptive pills  Z30.41 VESTURA 3-0.02 MG tablet      3. Mass overlapping multiple quadrants of right breast  N63.15 US Breast Right Limited 1-3 Quadrants          PLAN:  29 year old female here today for her annual well woman exam. Small lump noted on right breast- has had an US for this but is due for a follow up US, order placed. DESMOND refilled. Follow up in 1 year or sooner as needed. Pap UTD    Joycelyn CASTRO student    I was present with the student who participated in the service and in the documentation of the note. I have verified the history and personally performed the physical  exam and medical decision-making. I agree with the assessment and plan of care as documented in the note.      MATT Jonas CNP

## 2025-06-03 ENCOUNTER — OFFICE VISIT (OUTPATIENT)
Dept: OBGYN | Facility: CLINIC | Age: 30
End: 2025-06-03
Payer: COMMERCIAL

## 2025-06-03 VITALS
HEIGHT: 70 IN | BODY MASS INDEX: 23.37 KG/M2 | SYSTOLIC BLOOD PRESSURE: 110 MMHG | WEIGHT: 163.2 LBS | DIASTOLIC BLOOD PRESSURE: 60 MMHG

## 2025-06-03 DIAGNOSIS — Z30.41 ENCOUNTER FOR SURVEILLANCE OF CONTRACEPTIVE PILLS: ICD-10-CM

## 2025-06-03 DIAGNOSIS — Z01.419 ENCOUNTER FOR GYNECOLOGICAL EXAMINATION WITHOUT ABNORMAL FINDING: Primary | ICD-10-CM

## 2025-06-03 DIAGNOSIS — N63.15 MASS OVERLAPPING MULTIPLE QUADRANTS OF RIGHT BREAST: ICD-10-CM

## 2025-06-03 PROCEDURE — 3078F DIAST BP <80 MM HG: CPT | Performed by: NURSE PRACTITIONER

## 2025-06-03 PROCEDURE — 99395 PREV VISIT EST AGE 18-39: CPT | Performed by: NURSE PRACTITIONER

## 2025-06-03 PROCEDURE — 3074F SYST BP LT 130 MM HG: CPT | Performed by: NURSE PRACTITIONER

## 2025-06-03 PROCEDURE — 99459 PELVIC EXAMINATION: CPT | Performed by: NURSE PRACTITIONER

## 2025-06-03 RX ORDER — DROSPIRENONE AND ETHINYL ESTRADIOL 0.02-3(28)
1 KIT ORAL DAILY
Qty: 84 TABLET | Refills: 3 | Status: SHIPPED | OUTPATIENT
Start: 2025-06-03

## 2025-06-23 ENCOUNTER — HOSPITAL ENCOUNTER (OUTPATIENT)
Dept: MAMMOGRAPHY | Facility: CLINIC | Age: 30
Discharge: HOME | End: 2025-06-23
Attending: NURSE PRACTITIONER | Admitting: NURSE PRACTITIONER
Payer: COMMERCIAL

## 2025-06-23 DIAGNOSIS — N63.15 MASS OVERLAPPING MULTIPLE QUADRANTS OF RIGHT BREAST: ICD-10-CM

## 2025-06-23 PROCEDURE — 76642 ULTRASOUND BREAST LIMITED: CPT | Mod: RT
